# Patient Record
Sex: MALE | Race: WHITE | URBAN - METROPOLITAN AREA
[De-identification: names, ages, dates, MRNs, and addresses within clinical notes are randomized per-mention and may not be internally consistent; named-entity substitution may affect disease eponyms.]

---

## 2017-09-27 ENCOUNTER — HISTORICAL (OUTPATIENT)
Dept: RADIOLOGY | Facility: HOSPITAL | Age: 72
End: 2017-09-27

## 2024-05-02 ENCOUNTER — HOSPITAL ENCOUNTER (EMERGENCY)
Facility: HOSPITAL | Age: 79
Discharge: HOME | End: 2024-05-02
Attending: EMERGENCY MEDICINE
Payer: MEDICARE

## 2024-05-02 VITALS
HEIGHT: 67 IN | WEIGHT: 300 LBS | OXYGEN SATURATION: 98 % | TEMPERATURE: 98 F | DIASTOLIC BLOOD PRESSURE: 89 MMHG | HEART RATE: 91 BPM | SYSTOLIC BLOOD PRESSURE: 159 MMHG | BODY MASS INDEX: 47.09 KG/M2 | RESPIRATION RATE: 16 BRPM

## 2024-05-02 DIAGNOSIS — S60.221A CONTUSION OF RIGHT HAND, INITIAL ENCOUNTER: Primary | ICD-10-CM

## 2024-05-02 PROCEDURE — 29125 APPL SHORT ARM SPLINT STATIC: CPT | Mod: RT

## 2024-05-02 PROCEDURE — 99283 EMERGENCY DEPT VISIT LOW MDM: CPT | Mod: 25

## 2024-05-02 NOTE — Clinical Note
"Trenton Infante"Barbara was seen and treated in our emergency department on 5/2/2024.  He may return to work on 05/06/2024.       If you have any questions or concerns, please don't hesitate to call.       LPN    "

## 2024-05-02 NOTE — ED PROVIDER NOTES
Encounter Date: 5/2/2024       History     Chief Complaint   Patient presents with    Hand Injury     Pt states he injured his rt hand on a bus door on Friday, states still hurting has bruise to to outer aspect of hand.     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  Patient is left-hand dominant.  Patient has been taking Tylenol with some relief of his pain.  States the pain is worse when he makes a fist and relieved by rest.  Denies associated elbow finger or shoulder pain.  Review of patient's allergies indicates:  No Known Allergies  No past medical history on file.  No past surgical history on file.  No family history on file.     Review of Systems   All other systems reviewed and are negative.      Physical Exam     Initial Vitals [05/02/24 0852]   BP Pulse Resp Temp SpO2   (!) 162/94 94 18 96.8 °F (36 °C) 98 %      MAP       --         Physical Exam    Nursing note and vitals reviewed.  Constitutional: He appears well-developed and well-nourished.   HENT:   Head: Normocephalic and atraumatic.   Eyes: Conjunctivae and EOM are normal. Pupils are equal, round, and reactive to light.   Neck: Neck supple.   Normal range of motion.  Cardiovascular:  Normal rate, regular rhythm, normal heart sounds and intact distal pulses.           Pulmonary/Chest: Breath sounds normal.   Abdominal: Abdomen is soft. Bowel sounds are normal.   Musculoskeletal:         General: Tenderness present.      Cervical back: Normal range of motion and neck supple.     Neurological: He is alert and oriented to person, place, and time.   Skin: Skin is warm and dry. Capillary refill takes less than 2 seconds.   Psychiatric: He has a normal mood and affect. His behavior is normal. Judgment and thought content normal.         ED Course   Procedures  Labs Reviewed - No data to display       Imaging  Results              X-Ray Hand 3 view Right (In process)  Result time 05/02/24 11:16:43                     Medications - No data to display  Medical Decision Making  Amount and/or Complexity of Data Reviewed  Radiology: ordered.    78-year-old male complaining of right hand pain over the dorsum of his right hand just proximal to the 5th MCP.  There is minimal tenderness on exam with no obvious deformity.  Skin is intact, neurovascularly intact.  X-ray with no obvious fractures.  We will place in splint, Tylenol for pain, warm compresses to affected area, close follow up with PCP in 1-2 days for recheck, return for worsening symptoms.                                  Clinical Impression:  Final diagnoses:  [J48.509Y] Contusion of right hand, initial encounter (Primary)                 Destin Leggett MD  05/03/24 0701